# Patient Record
Sex: FEMALE | Race: WHITE | NOT HISPANIC OR LATINO | Employment: OTHER | URBAN - METROPOLITAN AREA
[De-identification: names, ages, dates, MRNs, and addresses within clinical notes are randomized per-mention and may not be internally consistent; named-entity substitution may affect disease eponyms.]

---

## 2021-03-06 ENCOUNTER — OFFICE VISIT (OUTPATIENT)
Dept: URGENT CARE | Facility: CLINIC | Age: 36
End: 2021-03-06
Payer: COMMERCIAL

## 2021-03-06 VITALS
TEMPERATURE: 98.5 F | BODY MASS INDEX: 45.52 KG/M2 | HEIGHT: 67 IN | RESPIRATION RATE: 16 BRPM | DIASTOLIC BLOOD PRESSURE: 65 MMHG | SYSTOLIC BLOOD PRESSURE: 146 MMHG | HEART RATE: 86 BPM | WEIGHT: 290 LBS

## 2021-03-06 DIAGNOSIS — R30.9 PAINFUL URINATION: ICD-10-CM

## 2021-03-06 DIAGNOSIS — N30.90 CYSTITIS: Primary | ICD-10-CM

## 2021-03-06 LAB
SL AMB  POCT GLUCOSE, UA: NEGATIVE
SL AMB LEUKOCYTE ESTERASE,UA: NEGATIVE
SL AMB POCT BILIRUBIN,UA: NEGATIVE
SL AMB POCT BLOOD,UA: NEGATIVE
SL AMB POCT CLARITY,UA: CLEAR
SL AMB POCT COLOR,UA: YELLOW
SL AMB POCT KETONES,UA: NEGATIVE
SL AMB POCT NITRITE,UA: NEGATIVE
SL AMB POCT PH,UA: 7
SL AMB POCT SPECIFIC GRAVITY,UA: 1.02
SL AMB POCT URINE PROTEIN: NEGATIVE
SL AMB POCT UROBILINOGEN: NEGATIVE

## 2021-03-06 PROCEDURE — 99204 OFFICE O/P NEW MOD 45 MIN: CPT | Performed by: FAMILY MEDICINE

## 2021-03-06 PROCEDURE — 81002 URINALYSIS NONAUTO W/O SCOPE: CPT | Performed by: FAMILY MEDICINE

## 2021-03-06 PROCEDURE — 87086 URINE CULTURE/COLONY COUNT: CPT | Performed by: FAMILY MEDICINE

## 2021-03-06 RX ORDER — IMMUNE GLOBULIN INFUSION (HUMAN) 100 MG/ML
INJECTION, SOLUTION INTRAVENOUS; SUBCUTANEOUS ONCE
COMMUNITY

## 2021-03-06 RX ORDER — INSULIN ASPART 100 [IU]/ML
INJECTION, SOLUTION INTRAVENOUS; SUBCUTANEOUS
COMMUNITY
Start: 2021-02-27

## 2021-03-06 RX ORDER — INSULIN GLARGINE 100 [IU]/ML
10 INJECTION, SOLUTION SUBCUTANEOUS
COMMUNITY
Start: 2021-02-19

## 2021-03-06 RX ORDER — AMOXICILLIN 500 MG/1
500 CAPSULE ORAL 3 TIMES DAILY
Qty: 21 CAPSULE | Refills: 0 | Status: SHIPPED | OUTPATIENT
Start: 2021-03-06 | End: 2021-03-13

## 2021-03-06 RX ORDER — FAMOTIDINE 40 MG/1
40 TABLET, FILM COATED ORAL DAILY
COMMUNITY
Start: 2021-01-05

## 2021-03-06 RX ORDER — GABAPENTIN 100 MG/1
300 CAPSULE ORAL 2 TIMES DAILY
COMMUNITY
Start: 2021-01-05

## 2021-03-06 RX ORDER — BACLOFEN 10 MG/1
TABLET ORAL
COMMUNITY
Start: 2021-03-03

## 2021-03-06 RX ORDER — BISOPROLOL FUMARATE 10 MG/1
10 TABLET ORAL DAILY
COMMUNITY

## 2021-03-06 RX ORDER — ESCITALOPRAM OXALATE 10 MG/1
10 TABLET ORAL EVERY MORNING
COMMUNITY
Start: 2021-02-27

## 2021-03-06 NOTE — PROGRESS NOTES
Assessment/Plan:   Recommend plenty of fluids  She will follow-up with gyn later this week  Otherwise follow-up with her family physician  Diagnoses and all orders for this visit:    Cystitis  -     amoxicillin (AMOXIL) 500 mg capsule; Take 1 capsule (500 mg total) by mouth 3 (three) times a day for 7 days    Painful urination  -     POCT urine dip  -     Urine culture    Other orders  -     baclofen 10 mg tablet  -     escitalopram (LEXAPRO) 10 mg tablet; Take 10 mg by mouth every morning  -     famotidine (PEPCID) 40 MG tablet; Take 40 mg by mouth daily  -     gabapentin (NEURONTIN) 100 mg capsule; Take 300 mg by mouth 2 (two) times a day  -     NovoLOG FlexPen 100 units/mL injection pen; INJECT 10 UNITS SUBCUTANEOUSLY TWICE DAILY PLUS SLIDING SCALE  MAX DAILY DOSE 50 UNITS EVERY 24 HOURS  -     Lantus SoloStar 100 units/mL injection pen; INJECT 60 UNITS SUBCUTANEOUSLY NIGHTLY  -     bisoprolol (ZEBETA) 10 MG tablet; Take 10 mg by mouth daily  -     immune globulin, human (GAMMAGARD) 10 GM/100ML; Infuse into a venous catheter once 15 grams weekly  -     Ocrelizumab (OCREVUS IV); Infuse 1,000 mg into a venous catheter Every 5 month          Subjective:     Patient ID: Radha Meraz is a 37 y o  female  Complains of bladder discomfort for about a week  She did notice blood on toilet tissue when she wiped last night  No fever  He does have a history of recurrent UTIs  She has a history of MS and diabetes and interstitial cystitis  Review of Systems   Constitutional: Negative for activity change, appetite change and fever  HENT: Negative  Eyes: Negative  Respiratory: Negative  Genitourinary: Positive for dysuria, frequency and urgency  Negative for flank pain  Objective:     Physical Exam  Vitals signs and nursing note reviewed  Constitutional:       Appearance: She is well-developed  HENT:      Head: Normocephalic and atraumatic        Right Ear: External ear normal       Left Ear: External ear normal       Nose: Nose normal    Eyes:      Conjunctiva/sclera: Conjunctivae normal       Pupils: Pupils are equal, round, and reactive to light  Neck:      Musculoskeletal: Normal range of motion and neck supple  Cardiovascular:      Rate and Rhythm: Normal rate and regular rhythm  Heart sounds: Normal heart sounds  Pulmonary:      Effort: Pulmonary effort is normal       Breath sounds: Normal breath sounds  Abdominal:      General: Bowel sounds are normal       Palpations: Abdomen is soft  Skin:     General: Skin is warm and dry  Neurological:      Mental Status: She is alert and oriented to person, place, and time  Deep Tendon Reflexes: Reflexes are normal and symmetric     Psychiatric:         Behavior: Behavior normal

## 2021-03-07 LAB — BACTERIA UR CULT: NORMAL

## 2022-07-28 ENCOUNTER — APPOINTMENT (OUTPATIENT)
Dept: RADIOLOGY | Facility: CLINIC | Age: 37
End: 2022-07-28
Payer: COMMERCIAL

## 2022-07-28 ENCOUNTER — OFFICE VISIT (OUTPATIENT)
Dept: URGENT CARE | Facility: CLINIC | Age: 37
End: 2022-07-28
Payer: COMMERCIAL

## 2022-07-28 VITALS
DIASTOLIC BLOOD PRESSURE: 58 MMHG | HEIGHT: 67 IN | SYSTOLIC BLOOD PRESSURE: 104 MMHG | RESPIRATION RATE: 20 BRPM | WEIGHT: 285 LBS | OXYGEN SATURATION: 96 % | BODY MASS INDEX: 44.73 KG/M2 | HEART RATE: 92 BPM | TEMPERATURE: 98.3 F

## 2022-07-28 DIAGNOSIS — M25.521 RIGHT ELBOW PAIN: ICD-10-CM

## 2022-07-28 DIAGNOSIS — S50.01XA CONTUSION OF RIGHT ELBOW, INITIAL ENCOUNTER: Primary | ICD-10-CM

## 2022-07-28 PROCEDURE — S9088 SERVICES PROVIDED IN URGENT: HCPCS | Performed by: PHYSICIAN ASSISTANT

## 2022-07-28 PROCEDURE — 73080 X-RAY EXAM OF ELBOW: CPT

## 2022-07-28 PROCEDURE — 99213 OFFICE O/P EST LOW 20 MIN: CPT | Performed by: PHYSICIAN ASSISTANT

## 2022-07-28 RX ORDER — DULAGLUTIDE 1.5 MG/.5ML
INJECTION, SOLUTION SUBCUTANEOUS WEEKLY
COMMUNITY
Start: 2022-07-08

## 2022-07-28 NOTE — PROGRESS NOTES
[facility]        NAME: No Arevalo is a 30 y o  female  : 1985    MRN: [id number]  DATE: 2022  TIME: 3:52 PM    Assessment and Plan   Contusion of right elbow, initial encounter [S50 01XA]  1  Contusion of right elbow, initial encounter  XR elbow 3+ vw right     ACE applied to right elbow    Patient Instructions   Rest, ice, compression, elevation  Ace bandage  Gentle range of motions stretching  Tylenol ibuprofen  Follow-up with orthopedics if symptoms continue  Follow-up with family doctor in 3-5 days  Go to ER symptoms become severe  Please be aware that x-rays only show bony abnormalities  It does not adequately show soft tissue, tendons, ligaments, or muscles abdnormalities  If symptoms continue, please follow-up with your family doctor or orthopedics for additional evaluation and imaging modalities  Follow up with PCP in 3-5 days  Proceed to  ER if symptoms worsen  Chief Complaint     Chief Complaint   Patient presents with    Elbow Pain     Reports almost fell last pm and struck right elbow on door frame  C/O pain over right elbow with radiation of pain to hand at times dependent upon motion of arm  History of Present Illness       Patient is a 53-year-old female with significant past medical history of diabetes and MS presents the office complaining of right elbow pain status post hitting against a door frame yesterday  Pain is located to the posterior aspect described as 4-10 with intermittent radiation to her hand  Denies any swelling or ecchymosis  Denies any prior significant injuries to the elbow  She not take anything for pain  Patient is right-hand dominant  Review of Systems   Review of Systems   Musculoskeletal: Positive for arthralgias  Negative for joint swelling  Skin: Negative for color change  Neurological: Positive for numbness           Current Medications       Current Outpatient Medications:     Acetylcarnitine HCl 500 MG CAPS, Take 2 tablets by mouth daily, Disp: , Rfl:     bisoprolol (ZEBETA) 10 MG tablet, Take 10 mg by mouth daily, Disp: , Rfl:     Cholecalciferol 125 MCG (5000 UT) capsule, Take 5,000 Units by mouth daily, Disp: , Rfl:     escitalopram (LEXAPRO) 10 mg tablet, Take 10 mg by mouth every morning, Disp: , Rfl:     famotidine (PEPCID) 40 MG tablet, Take 40 mg by mouth daily, Disp: , Rfl:     Lantus SoloStar 100 units/mL injection pen, Inject 10 Units under the skin daily at bedtime, Disp: , Rfl:     Ocrelizumab (OCREVUS IV), Infuse 1,000 mg into a venous catheter Every 5 month, Disp: , Rfl:     Riboflavin (Vitamin B-2) 100 MG TABS, Take 100 mg by mouth daily, Disp: , Rfl:     Trulicity 1 5 PP/0 7ZI SOPN, once a week, Disp: , Rfl:     baclofen 10 mg tablet, , Disp: , Rfl:     cyanocobalamin (VITAMIN B-12) 500 MCG tablet, Take 500 mcg by mouth daily, Disp: , Rfl:     gabapentin (NEURONTIN) 100 mg capsule, Take 300 mg by mouth 2 (two) times a day (Patient not taking: Reported on 7/28/2022), Disp: , Rfl:     immune globulin, human (GAMMAGARD) 10 GM/100ML, Infuse into a venous catheter once 15 grams weekly (Patient not taking: Reported on 7/28/2022), Disp: , Rfl:     NovoLOG FlexPen 100 units/mL injection pen, INJECT 10 UNITS SUBCUTANEOUSLY TWICE DAILY PLUS SLIDING SCALE   MAX DAILY DOSE 50 UNITS EVERY 24 HOURS (Patient not taking: Reported on 7/28/2022), Disp: , Rfl:     Current Allergies     Allergies as of 07/28/2022 - Reviewed 07/28/2022   Allergen Reaction Noted    Lisinopril Hives, Shortness Of Breath, and Swelling 10/28/2014    Shellfish-derived products - food allergy Anaphylaxis and Hives 04/30/2020    Tofacitinib Hives 10/28/2014    Ciprofloxacin Rash 03/06/2021    Macrobid [nitrofurantoin] Rash 03/06/2021    Sulfa antibiotics Rash 03/06/2021            The following portions of the patient's history were reviewed and updated as appropriate: allergies, current medications, past family history, past medical history, past social history, past surgical history and problem list      Past Medical History:   Diagnosis Date    Arthritis     rheumatoid    Cystitis     [facility]     MS [facility]multiple sclerosis) [facility]     [facility]     Tachycardia     Urinary tract infection        Past Surgical History:   Procedure Laterality Date    ADENOIDECTOMY      CHOLECYSTECTOMY      SINUS SURGERY      SKIN CANCER EXCISION      TONSILLECTOMY         Family History   Problem Relation Age of Onset    Diabetes Mother     Hypertension Mother          Medications have been verified  Objective   /58   Pulse 92   Temp 98 3 °F (36 8 °C)   Resp 20   Ht 5' 7" (1 702 m)   Wt 129 kg (285 lb)   LMP 07/18/2022 (Approximate)   SpO2 96%   BMI 44 64 kg/m²   Patient's last menstrual period was 07/18/2022 (approximate)  Physical Exam     Physical Exam  Vitals and nursing note reviewed  Constitutional:       Appearance: She is well-developed  HENT:      Head: Normocephalic and atraumatic  Right Ear: External ear normal       Left Ear: External ear normal       Nose: Nose normal    Eyes:      General: Lids are normal       Conjunctiva/sclera: Conjunctivae normal    Musculoskeletal:      Right elbow: No swelling or deformity  Normal range of motion  Tenderness (2 cm distal of the olecranon) present  No radial head, medial epicondyle, lateral epicondyle or olecranon process tenderness  Skin:     General: Skin is warm and dry  Capillary Refill: Capillary refill takes less than 2 seconds  Neurological:      Mental Status: She is alert  Right elbow x-ray:  No evidence of acute osseous abnormalities  Radiology interpretation pending

## 2022-07-28 NOTE — PATIENT INSTRUCTIONS
Rest, ice, compression, elevation  Ace bandage  Gentle range of motions stretching  Tylenol ibuprofen  Follow-up with orthopedics if symptoms continue  Follow-up with family doctor in 3-5 days  Go to ER symptoms become severe  Please be aware that x-rays only show bony abnormalities  It does not adequately show soft tissue, tendons, ligaments, or muscles abdnormalities  If symptoms continue, please follow-up with your family doctor or orthopedics for additional evaluation and imaging modalities  Acitretin Pregnancy And Lactation Text: This medication is Pregnancy Category X and should not be given to women who are pregnant or may become pregnant in the future. This medication is excreted in breast milk.

## 2024-01-23 ENCOUNTER — OFFICE VISIT (OUTPATIENT)
Dept: URGENT CARE | Facility: CLINIC | Age: 39
End: 2024-01-23
Payer: COMMERCIAL

## 2024-01-23 VITALS
BODY MASS INDEX: 44.57 KG/M2 | WEIGHT: 284 LBS | TEMPERATURE: 98.8 F | OXYGEN SATURATION: 99 % | SYSTOLIC BLOOD PRESSURE: 139 MMHG | HEART RATE: 98 BPM | HEIGHT: 67 IN | DIASTOLIC BLOOD PRESSURE: 76 MMHG | RESPIRATION RATE: 18 BRPM

## 2024-01-23 DIAGNOSIS — J06.9 ACUTE URI: Primary | ICD-10-CM

## 2024-01-23 PROCEDURE — S9088 SERVICES PROVIDED IN URGENT: HCPCS | Performed by: PHYSICIAN ASSISTANT

## 2024-01-23 PROCEDURE — 99213 OFFICE O/P EST LOW 20 MIN: CPT | Performed by: PHYSICIAN ASSISTANT

## 2024-01-23 PROCEDURE — 87636 SARSCOV2 & INF A&B AMP PRB: CPT | Performed by: PHYSICIAN ASSISTANT

## 2024-01-23 RX ORDER — BENZONATATE 200 MG/1
200 CAPSULE ORAL 3 TIMES DAILY PRN
Qty: 20 CAPSULE | Refills: 0 | Status: SHIPPED | OUTPATIENT
Start: 2024-01-23

## 2024-01-23 RX ORDER — FLUTICASONE PROPIONATE 50 MCG
2 SPRAY, SUSPENSION (ML) NASAL DAILY
Qty: 16 G | Refills: 0 | Status: SHIPPED | OUTPATIENT
Start: 2024-01-23

## 2024-01-23 RX ORDER — AZITHROMYCIN 250 MG/1
TABLET, FILM COATED ORAL
Qty: 6 TABLET | Refills: 0 | Status: SHIPPED | OUTPATIENT
Start: 2024-01-23 | End: 2024-01-27

## 2024-01-23 RX ORDER — TIRZEPATIDE 10 MG/.5ML
10 INJECTION, SOLUTION SUBCUTANEOUS WEEKLY
COMMUNITY
Start: 2023-08-02

## 2024-01-23 NOTE — PATIENT INSTRUCTIONS
Use medications as directed for symptomatic relief as needed  Continue with home medications as prescribed  If symptoms do not improve in the next 3 days fill prescription for Zithromax and start taking  Follow up with PCP in 3-5 days.  Proceed to  ER if symptoms worsen.    Upper Respiratory Infection   AMBULATORY CARE:   An upper respiratory infection  is also called a cold. Your nose, throat, ears, and sinuses may be affected. You are more likely to get a cold in the winter. Your risk of getting a cold may be increased if you smoke cigarettes or have allergies, such as hay fever.  What causes a cold?  A cold is caused by a virus. Many viruses can cause a cold, and each is contagious. This means the virus can be easily spread to another person when the sick person coughs or sneezes. The virus can also be spread if you touch an object the virus is on and then touch your eyes, mouth, or nose.  Cold symptoms  are usually worst for the first 3 to 5 days. You may have any of the following:  Runny or stuffy nose    Sneezing and coughing    Sore throat or hoarseness    Red, watery, and sore eyes    Fatigue (you feel more tired than usual)    Chills and fever    Headache, body aches, or sore muscles    Call your local emergency number (911 in the [place]) if:   You have chest pain or trouble breathing.      Seek care immediately if:   You have a fever over 102ºF (39ºC).      Call your doctor if:   You have a low fever.    Your sore throat gets worse or you see white or yellow spots in your throat.    Your symptoms get worse after 3 to 5 days or are not better in 14 days.    You have a rash anywhere on your skin.    You have large, tender lumps in your neck.    You have thick, green, or yellow drainage from your nose.    You cough up thick yellow, green, or bloody mucus.    You have a bad earache.    You have questions or concerns about your condition or care.    Treatment:  Colds are caused by viruses and do not get better with  antibiotics. Most people get better in 7 to 14 days. You may continue to cough for 2 to 3 weeks. The following may help decrease your symptoms:  Decongestants  help reduce nasal congestion and help you breathe more easily. If you take decongestant pills, they may make you feel restless or not able to sleep. Do not use decongestant sprays for more than a few days.    Cough suppressants  help reduce coughing. Ask your healthcare provider which type of cough medicine is best for you.     NSAIDs , such as ibuprofen, help decrease swelling, pain, and fever. NSAIDs can cause stomach bleeding or kidney problems in certain people. If you take blood thinner medicine, always ask your healthcare provider if NSAIDs are safe for you. Always read the medicine label and follow directions.    Acetaminophen  decreases pain and fever. It is available without a doctor's order. Ask how much to take and how often to take it. Follow directions. Read the labels of all other medicines you are using to see if they also contain acetaminophen, or ask your doctor or pharmacist. Acetaminophen can cause liver damage if not taken correctly.    Manage a cold:   Rest as much as possible.  Slowly start to do more each day.    Drink more liquids as directed.  Liquids will help thin and loosen mucus so you can cough it up. Liquids will also help prevent dehydration. Liquids that help prevent dehydration include water, fruit juice, and broth. Do not drink liquids that contain caffeine. Caffeine can increase your risk for dehydration. Ask your healthcare provider how much liquid to drink each day.    Soothe a sore throat.  Gargle with warm salt water. Make salt water by dissolving ¼ teaspoon salt in 1 cup warm water. You may also suck on hard candy or throat lozenges. You may use a sore throat spray.    Use a humidifier or vaporizer.  Use a cool mist humidifier or a vaporizer to increase air moisture in your home. This may make it easier for you to  breathe and help decrease your cough.    Use saline nasal drops as directed.  These help relieve congestion.    Apply petroleum-based jelly around the outside of your nostrils.  This can decrease irritation from blowing your nose.    Do not smoke.  Nicotine and other chemicals in cigarettes and cigars can make your symptoms worse. They can also cause infections such as bronchitis or pneumonia. Ask your healthcare provider for information if you currently smoke and need help to quit. E-cigarettes or smokeless tobacco still contain nicotine. Talk to your healthcare provider before you use these products.    Prevent a cold:   Wash your hands often.  Use soap and water every time you wash your hands. Rub your soapy hands together, lacing your fingers. Use the fingers of one hand to scrub under the nails of the other hand. Wash for at least 20 seconds. Rinse with warm, running water for several seconds. Then dry your hands. Use hand  gel if soap and water are not available. Do not touch your eyes or mouth without washing your hands first.         Cover a sneeze or cough.  Use a tissue that covers your mouth and nose. Put the used tissue in the trash right away. Use the bend of your arm if a tissue is not available. Wash your hands well with soap and water or use a hand . Do not stand close to anyone who is sneezing or coughing.    Try to stay away from others while you are sick.  This is especially important during the first 2 to 3 days when the virus is more easily spread. Wait until a fever, cough, or other symptoms are gone before you return to work or other regular activities.    Do not share items while you are sick.  This includes food, drinks, eating utensils, and dishes.    Follow up with your doctor as directed:  Write down your questions so you remember to ask them during your visits.  © Copyright [employer] 2023 Information is for End User's use only and may not be sold, redistributed or  otherwise used for commercial purposes.  The above information is an  only. It is not intended as medical advice for individual conditions or treatments. Talk to your doctor, nurse or pharmacist before following any medical regimen to see if it is safe and effective for you.

## 2024-01-23 NOTE — PROGRESS NOTES
[facility]        NAME: Mala Trujillo is a 38 y.o. female  : 1985    MRN: [id number]  DATE: 2024  TIME: 3:38 PM    Assessment and Plan   Acute URI [J06.9]  1. Acute URI  azithromycin (ZITHROMAX) 250 mg tablet    benzonatate (TESSALON) 200 MG capsule    Covid/Flu- Office Collect Normal    fluticasone (FLONASE) 50 mcg/act nasal spray    Covid/Flu- Office Collect Normal            Patient Instructions     Use medications as directed for symptomatic relief as needed  Continue with home medications as prescribed  If symptoms do not improve in the next 3 days fill prescription for Zithromax and start taking  Follow up with PCP in 3-5 days.  Proceed to  ER if symptoms worsen.    Chief Complaint     Chief Complaint   Patient presents with    Cold Like Symptoms     On abx 2 weeks ago for upper resp infection. Cough and SOB x 2 days          History of Present Illness       38-year-old female presents with chest tightness cough that is productive chills and general feeling ill.  Patient reports has a past medical history of immunodeficiency and sometimes does not get fevers.  Denies any chest pain.  No abdominal pain nausea or vomiting.  Has had some left ear pressure.  Denies any drainage from the ears.  No headaches reported.  Reports child recently ill with URI.    URI   This is a new problem. The current episode started in the past 7 days. The problem has been waxing and waning. There has been no fever. Associated symptoms include congestion, coughing, ear pain, rhinorrhea and a sore throat. Pertinent negatives include no abdominal pain, chest pain, dysuria, headaches, plugged ear sensation or wheezing. She has tried inhaler use and sleep for the symptoms. The treatment provided no relief.       Review of Systems   Review of Systems   Constitutional:  Positive for chills.   HENT:  Positive for congestion, ear pain, rhinorrhea and sore throat.    Eyes: Negative.    Respiratory:  Positive  for cough and chest tightness. Negative for wheezing.    Cardiovascular: Negative.  Negative for chest pain.   Gastrointestinal: Negative.  Negative for abdominal pain.   Genitourinary:  Negative for dysuria.   Musculoskeletal: Negative.    Skin: Negative.    Neurological: Negative.  Negative for headaches.         Current Medications       Current Outpatient Medications:     Acetylcarnitine HCl 500 MG CAPS, Take 2 tablets by mouth daily, Disp: , Rfl:     azithromycin (ZITHROMAX) 250 mg tablet, Take 2 tablets today then 1 tablet daily x 4 days, Disp: 6 tablet, Rfl: 0    benzonatate (TESSALON) 200 MG capsule, Take 1 capsule (200 mg total) by mouth 3 (three) times a day as needed for cough, Disp: 20 capsule, Rfl: 0    bisoprolol (ZEBETA) 10 MG tablet, Take 10 mg by mouth daily, Disp: , Rfl:     Cholecalciferol 125 MCG (5000 UT) capsule, Take 5,000 Units by mouth daily, Disp: , Rfl:     cyanocobalamin (VITAMIN B-12) 500 MCG tablet, Take 500 mcg by mouth daily, Disp: , Rfl:     escitalopram (LEXAPRO) 10 mg tablet, Take 10 mg by mouth every morning, Disp: , Rfl:     famotidine (PEPCID) 40 MG tablet, Take 40 mg by mouth daily, Disp: , Rfl:     fluticasone (FLONASE) 50 mcg/act nasal spray, 2 sprays into each nostril daily, Disp: 16 g, Rfl: 0    Lantus SoloStar 100 units/mL injection pen, Inject 10 Units under the skin daily at bedtime, Disp: , Rfl:     Mounjaro 10 MG/0.5ML, Inject 10 mg under the skin Once a week, Disp: , Rfl:     Ocrelizumab (OCREVUS IV), Infuse 1,000 mg into a venous catheter Every 5 month, Disp: , Rfl:     Riboflavin (Vitamin B-2) 100 MG TABS, Take 100 mg by mouth daily, Disp: , Rfl:     baclofen 10 mg tablet, , Disp: , Rfl:     gabapentin (NEURONTIN) 100 mg capsule, Take 300 mg by mouth 2 (two) times a day (Patient not taking: Reported on 7/28/2022), Disp: , Rfl:     immune globulin, human (GAMMAGARD) 10 GM/100ML, Infuse into a venous catheter once 15 grams weekly (Patient not taking: Reported on  "7/28/2022), Disp: , Rfl:     NovoLOG FlexPen 100 units/mL injection pen, INJECT 10 UNITS SUBCUTANEOUSLY TWICE DAILY PLUS SLIDING SCALE. MAX DAILY DOSE 50 UNITS EVERY 24 HOURS (Patient not taking: Reported on 7/28/2022), Disp: , Rfl:     Trulicity 1.5 MG/0.5ML SOPN, once a week (Patient not taking: Reported on 1/23/2024), Disp: , Rfl:     Current Allergies     Allergies as of 01/23/2024 - Reviewed 01/23/2024   Allergen Reaction Noted    Lisinopril Hives, Shortness Of Breath, and Swelling 10/28/2014    Shellfish-derived products - food allergy Anaphylaxis and Hives 04/30/2020    Tofacitinib Hives 10/28/2014    Ciprofloxacin Rash 03/06/2021    Macrobid [nitrofurantoin] Rash 03/06/2021    Sulfa antibiotics Rash 03/06/2021            The following portions of the patient's history were reviewed and updated as appropriate: allergies, current medications, past family history, past medical history, past social history, past surgical history and problem list.     Past Medical History:   Diagnosis Date    Arthritis     rheumatoid    Cystitis     Diabetes mellitus (HCC)     MS (multiple sclerosis) (HCC)     Primary immune deficiency disorder (HCC)     Tachycardia     Urinary tract infection        Past Surgical History:   Procedure Laterality Date    ADENOIDECTOMY      CHOLECYSTECTOMY      SINUS SURGERY      SKIN CANCER EXCISION      TONSILLECTOMY         Family History   Problem Relation Age of Onset    Diabetes Mother     Hypertension Mother          Medications have been verified.        Objective   /76   Pulse 98   Temp 98.8 °F (37.1 °C) (Tympanic)   Resp 18   Ht 5' 7\" (1.702 m)   Wt 129 kg (284 lb)   SpO2 99%   BMI 44.48 kg/m²   No LMP recorded.       Physical Exam     Physical Exam  Vitals and nursing note reviewed.   Constitutional:       General: She is not in acute distress.     Appearance: Normal appearance. She is well-developed.   HENT:      Head: Normocephalic and atraumatic.      Right Ear: Hearing, " tympanic membrane, ear canal and external ear normal. There is no impacted cerumen.      Left Ear: Hearing, tympanic membrane, ear canal and external ear normal. There is no impacted cerumen.      Nose: Congestion and rhinorrhea present.      Mouth/Throat:      Pharynx: Uvula midline. Posterior oropharyngeal erythema (Mild posterior) present. No oropharyngeal exudate.   Eyes:      General:         Right eye: No discharge.         Left eye: No discharge.      Conjunctiva/sclera: Conjunctivae normal.   Cardiovascular:      Rate and Rhythm: Normal rate and regular rhythm.      Heart sounds: Normal heart sounds. No murmur heard.  Pulmonary:      Effort: Pulmonary effort is normal. No respiratory distress.      Breath sounds: Normal breath sounds. No wheezing or rales.   Abdominal:      General: Bowel sounds are normal.      Palpations: Abdomen is soft.      Tenderness: There is no abdominal tenderness.   Musculoskeletal:         General: Normal range of motion.      Cervical back: Normal range of motion and neck supple.   Lymphadenopathy:      Cervical: No cervical adenopathy.   Skin:     General: Skin is warm and dry.   Neurological:      Mental Status: She is alert and oriented to person, place, and time.   Psychiatric:         Mood and Affect: Mood normal.

## 2024-01-23 NOTE — PROGRESS NOTES
[facility]        NAME: Mala Trujillo is a 38 y.o. female  : 1985    MRN: [id number]  DATE: 2024  TIME: 3:23 PM    Assessment and Plan   No primary diagnosis found.  No diagnosis found.      Patient Instructions       Follow up with PCP in 3-5 days.  Proceed to  ER if symptoms worsen.    Chief Complaint     Chief Complaint   Patient presents with    Cold Like Symptoms     On abx 2 weeks ago for upper resp infection. Cough and SOB x 2 days          History of Present Illness       HPI    Review of Systems   Review of Systems      Current Medications       Current Outpatient Medications:     Acetylcarnitine HCl 500 MG CAPS, Take 2 tablets by mouth daily, Disp: , Rfl:     bisoprolol (ZEBETA) 10 MG tablet, Take 10 mg by mouth daily, Disp: , Rfl:     Cholecalciferol 125 MCG (5000 UT) capsule, Take 5,000 Units by mouth daily, Disp: , Rfl:     cyanocobalamin (VITAMIN B-12) 500 MCG tablet, Take 500 mcg by mouth daily, Disp: , Rfl:     escitalopram (LEXAPRO) 10 mg tablet, Take 10 mg by mouth every morning, Disp: , Rfl:     famotidine (PEPCID) 40 MG tablet, Take 40 mg by mouth daily, Disp: , Rfl:     Lantus SoloStar 100 units/mL injection pen, Inject 10 Units under the skin daily at bedtime, Disp: , Rfl:     Mounjaro 10 MG/0.5ML, Inject 10 mg under the skin Once a week, Disp: , Rfl:     Ocrelizumab (OCREVUS IV), Infuse 1,000 mg into a venous catheter Every 5 month, Disp: , Rfl:     Riboflavin (Vitamin B-2) 100 MG TABS, Take 100 mg by mouth daily, Disp: , Rfl:     baclofen 10 mg tablet, , Disp: , Rfl:     gabapentin (NEURONTIN) 100 mg capsule, Take 300 mg by mouth 2 (two) times a day (Patient not taking: Reported on 2022), Disp: , Rfl:     immune globulin, human (GAMMAGARD) 10 GM/100ML, Infuse into a venous catheter once 15 grams weekly (Patient not taking: Reported on 2022), Disp: , Rfl:     NovoLOG FlexPen 100 units/mL injection pen, INJECT 10 UNITS SUBCUTANEOUSLY TWICE DAILY PLUS  "SLIDING SCALE. MAX DAILY DOSE 50 UNITS EVERY 24 HOURS (Patient not taking: Reported on 7/28/2022), Disp: , Rfl:     Trulicity 1.5 MG/0.5ML SOPN, once a week (Patient not taking: Reported on 1/23/2024), Disp: , Rfl:     Current Allergies     Allergies as of 01/23/2024 - Reviewed 01/23/2024   Allergen Reaction Noted    Lisinopril Hives, Shortness Of Breath, and Swelling 10/28/2014    Shellfish-derived products - food allergy Anaphylaxis and Hives 04/30/2020    Tofacitinib Hives 10/28/2014    Ciprofloxacin Rash 03/06/2021    Macrobid [nitrofurantoin] Rash 03/06/2021    Sulfa antibiotics Rash 03/06/2021            The following portions of the patient's history were reviewed and updated as appropriate: allergies, current medications, past family history, past medical history, past social history, past surgical history and problem list.     Past Medical History:   Diagnosis Date    Arthritis     rheumatoid    Cystitis     Diabetes mellitus (HCC)     MS (multiple sclerosis) (HCC)     Primary immune deficiency disorder (HCC)     Tachycardia     Urinary tract infection        Past Surgical History:   Procedure Laterality Date    ADENOIDECTOMY      CHOLECYSTECTOMY      SINUS SURGERY      SKIN CANCER EXCISION      TONSILLECTOMY         Family History   Problem Relation Age of Onset    Diabetes Mother     Hypertension Mother          Medications have been verified.        Objective   /76   Pulse 102   Temp 98.8 °F (37.1 °C) (Tympanic)   Resp 18   Ht 5' 7\" (1.702 m)   Wt 129 kg (284 lb)   SpO2 99%   BMI 44.48 kg/m²   No LMP recorded.       Physical Exam     Physical Exam              "

## 2024-01-24 LAB
FLUAV RNA RESP QL NAA+PROBE: NEGATIVE
FLUBV RNA RESP QL NAA+PROBE: NEGATIVE
SARS-COV-2 RNA RESP QL NAA+PROBE: NEGATIVE